# Patient Record
Sex: FEMALE | Race: WHITE | NOT HISPANIC OR LATINO | Employment: OTHER | URBAN - METROPOLITAN AREA
[De-identification: names, ages, dates, MRNs, and addresses within clinical notes are randomized per-mention and may not be internally consistent; named-entity substitution may affect disease eponyms.]

---

## 2022-12-01 ENCOUNTER — OFFICE VISIT (OUTPATIENT)
Dept: URGENT CARE | Facility: CLINIC | Age: 87
End: 2022-12-01
Payer: MEDICARE

## 2022-12-01 VITALS
TEMPERATURE: 98 F | RESPIRATION RATE: 18 BRPM | OXYGEN SATURATION: 97 % | DIASTOLIC BLOOD PRESSURE: 77 MMHG | HEIGHT: 66 IN | SYSTOLIC BLOOD PRESSURE: 180 MMHG | WEIGHT: 150 LBS | BODY MASS INDEX: 24.11 KG/M2 | HEART RATE: 81 BPM

## 2022-12-01 DIAGNOSIS — N39.0 ACUTE UTI: ICD-10-CM

## 2022-12-01 DIAGNOSIS — R31.9 HEMATURIA, UNSPECIFIED TYPE: Primary | ICD-10-CM

## 2022-12-01 LAB
BILIRUB UR QL STRIP: POSITIVE
GLUCOSE UR QL STRIP: NEGATIVE
KETONES UR QL STRIP: NEGATIVE
LEUKOCYTE ESTERASE UR QL STRIP: POSITIVE
PH, POC UA: 5
POC BLOOD, URINE: POSITIVE
POC NITRATES, URINE: POSITIVE
PROT UR QL STRIP: POSITIVE
SP GR UR STRIP: 1.02 (ref 1–1.03)
UROBILINOGEN UR STRIP-ACNC: 12 (ref 0.1–1.1)

## 2022-12-01 PROCEDURE — 99203 PR OFFICE/OUTPT VISIT, NEW, LEVL III, 30-44 MIN: ICD-10-PCS | Mod: ,,, | Performed by: PHYSICIAN ASSISTANT

## 2022-12-01 PROCEDURE — 99203 OFFICE O/P NEW LOW 30 MIN: CPT | Mod: ,,, | Performed by: PHYSICIAN ASSISTANT

## 2022-12-01 PROCEDURE — 81003 POCT URINALYSIS, DIPSTICK, AUTOMATED, W/O SCOPE: ICD-10-PCS | Mod: QW,,, | Performed by: PHYSICIAN ASSISTANT

## 2022-12-01 PROCEDURE — 87088 URINE BACTERIA CULTURE: CPT | Performed by: PHYSICIAN ASSISTANT

## 2022-12-01 PROCEDURE — 81003 URINALYSIS AUTO W/O SCOPE: CPT | Mod: QW,,, | Performed by: PHYSICIAN ASSISTANT

## 2022-12-01 RX ORDER — CIPROFLOXACIN 500 MG/1
500 TABLET ORAL 2 TIMES DAILY
Qty: 14 TABLET | Refills: 0 | Status: SHIPPED | OUTPATIENT
Start: 2022-12-01 | End: 2022-12-08

## 2022-12-01 RX ORDER — POTASSIUM &MAGNESIUM ASPARTATE 250-250 MG
CAPSULE ORAL DAILY
COMMUNITY

## 2022-12-01 RX ORDER — TAMSULOSIN HYDROCHLORIDE 0.4 MG/1
0.4 CAPSULE ORAL DAILY
Qty: 10 CAPSULE | Refills: 0 | Status: SHIPPED | OUTPATIENT
Start: 2022-12-01 | End: 2023-12-28 | Stop reason: ALTCHOICE

## 2022-12-01 RX ORDER — BRIMONIDINE TARTRATE 2 MG/ML
1 SOLUTION/ DROPS OPHTHALMIC EVERY 8 HOURS
COMMUNITY

## 2022-12-01 RX ORDER — SIMVASTATIN 10 MG/1
10 TABLET, FILM COATED ORAL NIGHTLY
COMMUNITY

## 2022-12-01 RX ORDER — ACETAMINOPHEN 500 MG
500 TABLET ORAL EVERY 6 HOURS PRN
COMMUNITY

## 2022-12-01 RX ORDER — LISINOPRIL 20 MG/1
20 TABLET ORAL DAILY
COMMUNITY

## 2022-12-01 RX ORDER — MAGNESIUM 250 MG
250 TABLET ORAL DAILY
COMMUNITY

## 2022-12-01 RX ORDER — METFORMIN HYDROCHLORIDE 500 MG/1
500 TABLET ORAL 2 TIMES DAILY WITH MEALS
COMMUNITY

## 2022-12-01 NOTE — PROGRESS NOTES
"Subjective:       Patient ID: Shaila Munoz is a 87 y.o. female.    Vitals:  height is 5' 6" (1.676 m) and weight is 68 kg (150 lb). Her oral temperature is 98.1 °F (36.7 °C). Her blood pressure is 180/77 (abnormal) and her pulse is 81. Her respiration is 18 and oxygen saturation is 97%.     Chief Complaint: Back Pain (Pt fell this morning, pt has been having back pain for two or three days. Pt's family member noticed blood in urine this morning. )    HPI  elderly female cared for by family noticing gross hematuria and reports of left lower back discomfort over the last several days different than patient slid out of chair this morning needing assistance.  Daughter transporting patient to urgent Care for initial evaluation.  Patient reports having mild left mid lower back discomfort taking Tylenol this morning resting comfortably at this time denies sacral pelvic or lumbar pain.  Daughter reports remote history of kidney stones and last known UTI 6 months ago.     Back Pain     Additional comments: pt has been having left lower back pain   for two or three days.  Family reports patient sliding down out of living room chair onto floor needing assistance by family members assisted to bathroom when family member noticed blood in urine this morning.       Constitution: Negative for chills and fever.   Gastrointestinal:  Negative for abdominal pain, nausea, vomiting, constipation and diarrhea.   Genitourinary:  Positive for flank pain, hematuria and history of kidney stones. Negative for dysuria, frequency, urgency, bladder incontinence and pelvic pain.   Musculoskeletal:  Positive for back pain. Negative for trauma, muscle cramps and muscle ache.   Skin: Negative.  Negative for rash, wound and erythema.   Neurological:  Negative for numbness and tingling.   Psychiatric/Behavioral: Negative.       Objective:      Physical Exam   Constitutional: She is oriented to person, place, and time. She appears well-developed. She " is cooperative.  Non-toxic appearance. She does not appear ill. No distress.      Comments:Awake alert pleasant female talking to daughter     HENT:   Head: Normocephalic.   Mouth/Throat: Oropharynx is clear and moist and mucous membranes are normal.   Eyes: Conjunctivae and lids are normal.   Neck: Trachea normal and phonation normal. Neck supple.   Cardiovascular: Normal rate, regular rhythm and normal pulses.   Pulmonary/Chest: Effort normal.   Abdominal: Normal appearance and bowel sounds are normal. Soft. flat abdomen There is no abdominal tenderness. There is no guarding, no left CVA tenderness and no right CVA tenderness.   Musculoskeletal:         General: No deformity.   Neurological: no focal deficit. She is alert and oriented to person, place, and time. She has normal strength and normal reflexes.   Skin: Skin is warm, dry, intact, not diaphoretic and no rash. No erythema         Comments: Back with no blisters no lesion , no vesicular outbreak   Psychiatric: Her speech is normal and behavior is normal. Mood, judgment and thought content normal.   Nursing note and vitals reviewed.         Previous History      Review of patient's allergies indicates:   Allergen Reactions    Meperidine     Penicillins     Promethazine     Sulfamethoxazole        Past Medical History:   Diagnosis Date    Colon cancer     Diabetes mellitus, type 2     Glaucoma     Hyperlipidemia     Hypertension      Current Outpatient Medications   Medication Instructions    acetaminophen (TYLENOL) 500 mg, Oral, Every 6 hours PRN    brimonidine 0.2% (ALPHAGAN) 0.2 % Drop 1 drop, Every 8 hours    ciprofloxacin HCl (CIPRO) 500 mg, Oral, 2 times daily    cranberry 500 mg Cap Oral, Daily    lisinopriL (PRINIVIL,ZESTRIL) 20 mg, Oral, Daily    magnesium 250 mg, Oral, Daily    metFORMIN (GLUCOPHAGE) 500 mg, Oral, 2 times daily with meals    simvastatin (ZOCOR) 10 mg, Oral, Nightly    tamsulosin (FLOMAX) 0.4 mg, Oral, Daily    timoloL (BETIMOL) 0.5  "% ophthalmic solution 1 drop, Daily     Past Surgical History:   Procedure Laterality Date    APPENDECTOMY      CATARACT EXTRACTION Bilateral     HEMICOLECTOMY Right     HEMORRHOID SURGERY      HYSTERECTOMY      TONSILLECTOMY       Family History   Problem Relation Age of Onset    Leukemia Mother     Liver cancer Father        Social History     Tobacco Use    Smoking status: Former     Types: Cigarettes     Passive exposure: Never    Smokeless tobacco: Never   Substance Use Topics    Alcohol use: Not Currently    Drug use: Never        Physical Exam      Vital Signs Reviewed   BP (!) 180/77   Pulse 81   Temp 98.1 °F (36.7 °C) (Oral)   Resp 18   Ht 5' 6" (1.676 m)   Wt 68 kg (150 lb)   SpO2 97%   BMI 24.21 kg/m²        Procedures    Procedures     Labs     Results for orders placed or performed in visit on 12/01/22   POCT Urinalysis, Dipstick, Automated, W/O Scope   Result Value Ref Range    POC Blood, Urine Positive (A) Negative    POC Bilirubin, Urine Positive (A) Negative    POC Urobilinogen, Urine 12 (A) 0.1 - 1.1    POC Ketones, Urine Negative Negative    POC Protein, Urine Positive (A) Negative    POC Nitrates, Urine Positive (A) Negative    POC Glucose, Urine Negative Negative    pH, UA 5     POC Specific Gravity, Urine 1.020 1.003 - 1.029    POC Leukocytes, Urine Positive (A) Negative       Assessment:       1. Hematuria, unspecified type    2. Acute UTI            Plan:     Case reviewed with patient son  Brain Munoz, concern for large urinary tract infection, tolerating oral fluids with no gross kidney stone visualized on KUB, given patient's penicillin and sulfur allergies will hold Rocephin antibiotic in favor of oral ciprofloxacin antibiotic coverage continued hydration and continued monitoring.    Recommend ciprofloxacin antibiotic coverage for urinary tract infection concern.  Encourage plenty water and noncarbonated fluids.  Recommend Flomax if needed for urinary hesitancy or flank lower " back pain discomfort.  Recommend alternating Tylenol and ibuprofen every 4-6 hours if needed for pain and inflammation.  Recommend follow-up with primary care physician in 3 days for or hematuria re-evaluation if not improving.  Recommend emergency department evaluation sooner if symptoms worsen, fever develops or vomiting unable to tolerate antibiotic.    Hematuria, unspecified type  -     POCT Urinalysis, Dipstick, Automated, W/O Scope  -     XR KUB; Future; Expected date: 12/01/2022    Acute UTI    Other orders  -     tamsulosin (FLOMAX) 0.4 mg Cap; Take 1 capsule (0.4 mg total) by mouth once daily. for 10 days  Dispense: 10 capsule; Refill: 0  -     ciprofloxacin HCl (CIPRO) 500 MG tablet; Take 1 tablet (500 mg total) by mouth 2 (two) times daily. for 7 days  Dispense: 14 tablet; Refill: 0

## 2022-12-01 NOTE — PATIENT INSTRUCTIONS
Recommend ciprofloxacin antibiotic coverage for urinary tract infection concern.  Encourage plenty water and noncarbonated fluids.  Recommend Flomax if needed for urinary hesitancy or flank lower back pain discomfort.  Recommend alternating Tylenol and ibuprofen every 4-6 hours if needed for pain and inflammation.  Recommend follow-up with primary care physician in 3 days for or hematuria re-evaluation if not improving.  Recommend emergency department evaluation sooner if symptoms worsen, fever develops or vomiting unable to tolerate antibiotic.

## 2022-12-03 LAB — BACTERIA UR CULT: NO GROWTH

## 2023-12-28 ENCOUNTER — HOSPITAL ENCOUNTER (EMERGENCY)
Facility: HOSPITAL | Age: 88
Discharge: HOME | End: 2023-12-28
Attending: EMERGENCY MEDICINE
Payer: MEDICARE

## 2023-12-28 VITALS
DIASTOLIC BLOOD PRESSURE: 84 MMHG | WEIGHT: 142 LBS | HEIGHT: 66 IN | SYSTOLIC BLOOD PRESSURE: 154 MMHG | OXYGEN SATURATION: 98 % | RESPIRATION RATE: 18 BRPM | HEART RATE: 76 BPM | BODY MASS INDEX: 22.82 KG/M2 | TEMPERATURE: 98 F

## 2023-12-28 DIAGNOSIS — R91.8 LUNG MASS: ICD-10-CM

## 2023-12-28 DIAGNOSIS — C79.51 METASTATIC CANCER TO BONE: Primary | ICD-10-CM

## 2023-12-28 DIAGNOSIS — M54.2 NECK PAIN: ICD-10-CM

## 2023-12-28 LAB
ALBUMIN SERPL-MCNC: 4.5 G/DL (ref 3.4–4.8)
ALBUMIN/GLOB SERPL: 1.3 RATIO (ref 1.1–2)
ALP SERPL-CCNC: 92 UNIT/L (ref 40–150)
ALT SERPL-CCNC: 10 UNIT/L (ref 0–55)
APPEARANCE UR: CLEAR
AST SERPL-CCNC: 16 UNIT/L (ref 5–34)
BACTERIA #/AREA URNS AUTO: NORMAL /HPF
BASOPHILS # BLD AUTO: 0.03 X10(3)/MCL
BASOPHILS NFR BLD AUTO: 0.4 %
BILIRUB SERPL-MCNC: 0.6 MG/DL
BILIRUB UR QL STRIP.AUTO: NEGATIVE
BNP BLD-MCNC: 78.6 PG/ML
BUN SERPL-MCNC: 14.5 MG/DL (ref 9.8–20.1)
CALCIUM SERPL-MCNC: 10 MG/DL (ref 8.4–10.2)
CHLORIDE SERPL-SCNC: 94 MMOL/L (ref 98–107)
CO2 SERPL-SCNC: 28 MMOL/L (ref 23–31)
COLOR UR AUTO: ABNORMAL
CREAT SERPL-MCNC: 0.82 MG/DL (ref 0.55–1.02)
EOSINOPHIL # BLD AUTO: 0.07 X10(3)/MCL (ref 0–0.9)
EOSINOPHIL NFR BLD AUTO: 1 %
ERYTHROCYTE [DISTWIDTH] IN BLOOD BY AUTOMATED COUNT: 12.3 % (ref 11.5–17)
GFR SERPLBLD CREATININE-BSD FMLA CKD-EPI: >60 MLS/MIN/1.73/M2
GLOBULIN SER-MCNC: 3.5 GM/DL (ref 2.4–3.5)
GLUCOSE SERPL-MCNC: 130 MG/DL (ref 82–115)
GLUCOSE UR QL STRIP.AUTO: NEGATIVE
HCT VFR BLD AUTO: 39.9 % (ref 37–47)
HGB BLD-MCNC: 13.3 G/DL (ref 12–16)
IMM GRANULOCYTES # BLD AUTO: 0.03 X10(3)/MCL (ref 0–0.04)
IMM GRANULOCYTES NFR BLD AUTO: 0.4 %
KETONES UR QL STRIP.AUTO: NEGATIVE
LEUKOCYTE ESTERASE UR QL STRIP.AUTO: NEGATIVE
LIPASE SERPL-CCNC: 22 U/L
LYMPHOCYTES # BLD AUTO: 0.84 X10(3)/MCL (ref 0.6–4.6)
LYMPHOCYTES NFR BLD AUTO: 11.5 %
MAGNESIUM SERPL-MCNC: 2.2 MG/DL (ref 1.6–2.6)
MCH RBC QN AUTO: 29.4 PG (ref 27–31)
MCHC RBC AUTO-ENTMCNC: 33.3 G/DL (ref 33–36)
MCV RBC AUTO: 88.3 FL (ref 80–94)
MONOCYTES # BLD AUTO: 0.49 X10(3)/MCL (ref 0.1–1.3)
MONOCYTES NFR BLD AUTO: 6.7 %
NEUTROPHILS # BLD AUTO: 5.85 X10(3)/MCL (ref 2.1–9.2)
NEUTROPHILS NFR BLD AUTO: 80 %
NITRITE UR QL STRIP.AUTO: NEGATIVE
NRBC BLD AUTO-RTO: 0 %
PH UR STRIP.AUTO: 7.5 [PH]
PLATELET # BLD AUTO: 261 X10(3)/MCL (ref 130–400)
PMV BLD AUTO: 7.8 FL (ref 7.4–10.4)
POTASSIUM SERPL-SCNC: 4.9 MMOL/L (ref 3.5–5.1)
PROT SERPL-MCNC: 8 GM/DL (ref 5.8–7.6)
PROT UR QL STRIP.AUTO: ABNORMAL
RBC # BLD AUTO: 4.52 X10(6)/MCL (ref 4.2–5.4)
RBC #/AREA URNS AUTO: NORMAL /HPF
RBC UR QL AUTO: ABNORMAL
SODIUM SERPL-SCNC: 132 MMOL/L (ref 136–145)
SP GR UR STRIP.AUTO: 1.02 (ref 1–1.03)
SQUAMOUS #/AREA URNS AUTO: NORMAL /HPF
TROPONIN I SERPL-MCNC: <0.01 NG/ML (ref 0–0.04)
UROBILINOGEN UR STRIP-ACNC: 0.2
WBC # SPEC AUTO: 7.31 X10(3)/MCL (ref 4.5–11.5)
WBC #/AREA URNS AUTO: NORMAL /HPF

## 2023-12-28 PROCEDURE — 81003 URINALYSIS AUTO W/O SCOPE: CPT | Performed by: EMERGENCY MEDICINE

## 2023-12-28 PROCEDURE — 25500020 PHARM REV CODE 255: Performed by: EMERGENCY MEDICINE

## 2023-12-28 PROCEDURE — 83690 ASSAY OF LIPASE: CPT | Performed by: EMERGENCY MEDICINE

## 2023-12-28 PROCEDURE — 93010 ELECTROCARDIOGRAM REPORT: CPT | Mod: ,,, | Performed by: INTERNAL MEDICINE

## 2023-12-28 PROCEDURE — 25000003 PHARM REV CODE 250: Performed by: EMERGENCY MEDICINE

## 2023-12-28 PROCEDURE — 84484 ASSAY OF TROPONIN QUANT: CPT | Performed by: EMERGENCY MEDICINE

## 2023-12-28 PROCEDURE — 85025 COMPLETE CBC W/AUTO DIFF WBC: CPT | Performed by: EMERGENCY MEDICINE

## 2023-12-28 PROCEDURE — 99285 EMERGENCY DEPT VISIT HI MDM: CPT | Mod: 25

## 2023-12-28 PROCEDURE — 80053 COMPREHEN METABOLIC PANEL: CPT | Performed by: EMERGENCY MEDICINE

## 2023-12-28 PROCEDURE — 83880 ASSAY OF NATRIURETIC PEPTIDE: CPT | Performed by: EMERGENCY MEDICINE

## 2023-12-28 PROCEDURE — 93005 ELECTROCARDIOGRAM TRACING: CPT

## 2023-12-28 PROCEDURE — 83735 ASSAY OF MAGNESIUM: CPT | Performed by: EMERGENCY MEDICINE

## 2023-12-28 RX ORDER — HYDROCODONE BITARTRATE AND ACETAMINOPHEN 5; 325 MG/1; MG/1
1 TABLET ORAL EVERY 6 HOURS PRN
Qty: 20 TABLET | Refills: 0 | Status: SHIPPED | OUTPATIENT
Start: 2023-12-28 | End: 2023-12-28

## 2023-12-28 RX ORDER — CLONIDINE HYDROCHLORIDE 0.1 MG/1
0.1 TABLET ORAL
Status: COMPLETED | OUTPATIENT
Start: 2023-12-28 | End: 2023-12-28

## 2023-12-28 RX ORDER — HYDROCODONE BITARTRATE AND ACETAMINOPHEN 5; 325 MG/1; MG/1
1 TABLET ORAL EVERY 6 HOURS PRN
Qty: 20 TABLET | Refills: 0 | Status: SHIPPED | OUTPATIENT
Start: 2023-12-28

## 2023-12-28 RX ORDER — IBUPROFEN 400 MG/1
400 TABLET ORAL
Status: COMPLETED | OUTPATIENT
Start: 2023-12-28 | End: 2023-12-28

## 2023-12-28 RX ORDER — ONDANSETRON 4 MG/1
4 TABLET, ORALLY DISINTEGRATING ORAL EVERY 6 HOURS PRN
Qty: 12 TABLET | Refills: 0 | Status: SHIPPED | OUTPATIENT
Start: 2023-12-28

## 2023-12-28 RX ORDER — ONDANSETRON 4 MG/1
4 TABLET, ORALLY DISINTEGRATING ORAL EVERY 6 HOURS PRN
Qty: 12 TABLET | Refills: 0 | Status: SHIPPED | OUTPATIENT
Start: 2023-12-28 | End: 2023-12-28

## 2023-12-28 RX ADMIN — IOPAMIDOL 100 ML: 755 INJECTION, SOLUTION INTRAVENOUS at 11:12

## 2023-12-28 RX ADMIN — IBUPROFEN 400 MG: 400 TABLET ORAL at 10:12

## 2023-12-28 RX ADMIN — CLONIDINE HYDROCHLORIDE 0.1 MG: 0.1 TABLET ORAL at 10:12

## 2023-12-28 NOTE — ED PROVIDER NOTES
Encounter Date: 12/28/2023       History     Chief Complaint   Patient presents with    Neck Pain     C/o neck pain right side for one month. States last night she bent down and heard a pop on the left. Pain to left neck 7 out of 10. Denies deficits. C/o abdomen incision site pain.      88-year-old female with a history of colon cancer which was stage IV in 2011, in remission after oral medication from [facility] for several years, presents to the emergency room due to severe neck pain.  Neck pain started on the right side in his now more on the left side and when she moved, she felt a pop in her neck last night she has been up all night due to pain.  No weakness or numbness to her arms or legs.  She also had a history of hypertension, but took her medication at 6am.  She has no chest pain but does get intermittent shortness of breath.  She has intermittent RUQ abdominal pain as well and has not had no cancer surveillance for the last several years.  Weight is been stable and appetite has been stable.        Review of patient's allergies indicates:   Allergen Reactions    Meperidine     Penicillins     Promethazine     Sulfamethoxazole      Past Medical History:   Diagnosis Date    Colon cancer     Diabetes mellitus, type 2     Glaucoma     Hyperlipidemia     Hypertension      Past Surgical History:   Procedure Laterality Date    APPENDECTOMY      CATARACT EXTRACTION Bilateral     HEMICOLECTOMY Right     HEMORRHOID SURGERY      HYSTERECTOMY      TONSILLECTOMY       Family History   Problem Relation Age of Onset    Leukemia Mother     Liver cancer Father      Social History     Tobacco Use    Smoking status: Former     Types: Cigarettes     Passive exposure: Never    Smokeless tobacco: Never   Substance Use Topics    Alcohol use: Not Currently    Drug use: Never     Review of Systems   Gastrointestinal:  Positive for abdominal pain.   Musculoskeletal:  Positive for neck pain.   All other systems reviewed and are  negative.      Physical Exam     Initial Vitals   BP Pulse Resp Temp SpO2   12/28/23 0924 12/28/23 0917 12/28/23 0917 12/28/23 0917 12/28/23 0924   (!) 210/103 78 18 98.2 °F (36.8 °C) 97 %      MAP       --                Physical Exam    Nursing note and vitals reviewed.  Constitutional: She appears well-developed and well-nourished. She is not diaphoretic. No distress.   HENT:   Head: Normocephalic and atraumatic.   Mouth/Throat: Oropharynx is clear and moist.   Eyes: Conjunctivae are normal. Pupils are equal, round, and reactive to light.   Neck: Neck supple.   Severe neck pain with any movement.   Cardiovascular:  Normal rate, regular rhythm, normal heart sounds and intact distal pulses.           Pulmonary/Chest: Breath sounds normal. No respiratory distress. She has no wheezes. She has no rhonchi. She has no rales.   Abdominal: Abdomen is soft. She exhibits no distension. There is no abdominal tenderness. There is no guarding.   Musculoskeletal:         General: No tenderness or edema. Normal range of motion.      Cervical back: Neck supple.      Comments: Normal motor and sensation to the upper and lower extremities     Neurological: She is alert and oriented to person, place, and time.   Skin: Skin is warm and dry. Capillary refill takes less than 2 seconds. No rash noted.   Psychiatric: She has a normal mood and affect. Thought content normal.         ED Course   Procedures  Labs Reviewed   COMPREHENSIVE METABOLIC PANEL - Abnormal; Notable for the following components:       Result Value    Sodium Level 132 (*)     Chloride 94 (*)     Glucose Level 130 (*)     Protein Total 8.0 (*)     All other components within normal limits   URINALYSIS, REFLEX TO URINE CULTURE - Abnormal; Notable for the following components:    Protein, UA Trace (*)     Blood, UA Small (*)     All other components within normal limits   MAGNESIUM - Normal   LIPASE - Normal   TROPONIN I - Normal   B-TYPE NATRIURETIC PEPTIDE - Normal    URINALYSIS, MICROSCOPIC - Normal   CBC WITH DIFFERENTIAL     EKG Readings: (Independently Interpreted)   Initial Reading: No STEMI. Rhythm: Normal Sinus Rhythm. Heart Rate: 75. ST Segments: Non-Specific ST Segment Depression. T Waves Flipped: AVL. Q Waves: II, III, V4, V5 and V6. Clinical Impression: Normal Sinus Rhythm       Imaging Results              CT Chest Abdomen Pelvis With IV Contrast (XPD) NO Oral Contrast (Final result)  Result time 12/28/23 12:34:18      Final result by Mona Mcpherson MD (12/28/23 12:34:18)                   Impression:      1. Left lower lobe mass and subcarinal adenopathy  2. Hypodense lesion at the liver, indeterminate.  Metastasis is a consideration.  3. Diverticulosis  4. Ideally recommend comparison with any available outside prior exams in this patient with known history of colon malignancy.      Electronically signed by: Mona Mcpherson  Date:    12/28/2023  Time:    12:34               Narrative:    EXAMINATION:  CT CHEST ABDOMEN PELVIS WITH IV CONTRAST (XPD)    CLINICAL HISTORY:  Shortness of breath, RUQ abd pain, history of colon cancer;    TECHNIQUE:  Helical acquisition with axial, sagittal and coronal reformations obtained from the thoracic inlet to the pubic symphysis following the IV administration of contrast.    Automated tube current modulation, weight-based exposure dosing, and/or iterative reconstruction technique utilized to reach lowest reasonably achievable exposure rate.    DLP: 1761 mGy*cm    COMPARISON:  No relevant priors available for comparison at time of this dictation    FINDINGS:  BASE OF NECK: Heterogeneous thyroid.    HEART: Normal size. No effusion.    THORACIC VASCULATURE: Aortic atherosclerosis. .Pulmonary arteries enhance normally.    OSCAR/MEDIASTINUM: There is enlarged lymph node measuring 1.4 cm short axis (10, 50).    AIRWAYS: Patent.    LUNGS/PLEURA: There is a 3.5 x 3.3 cm right lower lobe mass (7, 68).  Mild pulmonary  emphysema.    THORACIC SOFT TISSUES: Unremarkable.    LIVER: There is a 1.2 cm hypodense mass at hepatic segment 4 (10, 104).  A 5 mm subcapsular hypodensity at segment 4 is too small to accurately characterize (10, 95).  Vague hypodensity along the falciform ligament may be related to focal fatty infiltration.    BILIARY: No calcified gallstones.    PANCREAS: No inflammatory change.    SPLEEN: Normal in size    ADRENALS: No mass.    KIDNEYS/URETERS: There is an incidental exophytic cyst at the lower pole left kidney which appears simple and measures 2.7 cm.  No imaging follow-up necessary.  No hydronephrosis.    GI TRACT/MESENTERY:  No evidence of bowel obstruction.  There are postsurgical changes of right hemicolectomy.  Colonic diverticulosis without acute inflammatory change.    PERITONEUM: No free fluid.No free air.    LYMPH NODES: No enlarged lymph nodes by size criteria.    ABDOMINOPELVIC VASCULATURE: Aortoiliac atherosclerosis.    BLADDER: Normal appearance given degree of distention.    REPRODUCTIVE ORGANS: Uterus is surgically absent.    ABDOMINAL WALL: Unremarkable.    BONES: No acute osseous abnormality.                                       CT Cervical Spine Without Contrast (Final result)  Result time 12/28/23 12:12:09      Final result by Misha Anglin MD (12/28/23 12:12:09)                   Impression:      1. Large lytic abnormality involving portion of the dens extending across the base into the C2 body is concerning for a metastatic lesion.  Also small posterior soft tissue component without cord compression.    2. Additional small lytic abnormalities of the spinous process of C2 and the right aspect of C3 vertebral body are again concerning for metastatic lesions.    3. Cervical degenerative disc disease and spondylosis level by level discussed above.      Electronically signed by: Misha Anglin  Date:    12/28/2023  Time:    12:12               Narrative:    EXAMINATION:  CT CERVICAL SPINE  WITHOUT CONTRAST    CLINICAL HISTORY:  Neck pain, acute, known malignancy;    TECHNIQUE:  Sequential axial images were obtained of the cervical spine without contrast and the images were reformatted.    Dose length product was 321 mGycm. Approximated exposure control was utilized to minimize radiation dose.    COMPARISON:  None available    FINDINGS:  There is large lytic destructive abnormality which involves the lower half of dens and across the dens base extends into the C2 body with anterior-posterior diameter of 1.2 cm, transverse diameter of 1.4 cm and craniocaudal span of 4.5 cm on image 34 series 3, image 41 series 7 and image 26 series 5.  The involved posterior margin of C2 shows considerable thinning and discontinuities.  There is small soft tissue component along the posterior aspect of the dens without causing cord compression.  This is concerning for a metastatic lesion.  There is also 9 mm lytic abnormality of the spinous process of C2 on image 35 series 3.  Additionally there is suspected right aspect of C3 vertebral body small lytic lesion measuring 5 mm image 44 series 3.    There is grade 1 anterolisthesis of C4-C5.  There is no prevertebral soft tissue prominence.  Disc segmental analysis is given below:    At C2-C3, there is no disc herniation.  No central canal stenosis or narrowings of the neural foramen.    At C3-C4, there is osteophyte disc complex which indents the ventral thecal sac.  There is also bilateral facet arthropathy.  Cord is not compressed and there are no narrowings of the neural foramen.    At C4-C5, there is ventral osteophyte ridging indenting the ventral thecal sac without causing cord compression.  Mild narrowing of the right neural foramen is caused by uncovertebral and facet arthropathy.  The left neural foramen is unremarkable.    At C5-C6, there is broad osteophyte disc complex which indents the ventral thecal sac without causing cord compression.  There is bilateral  uncovertebral and facet arthropathy resulting in moderate narrowings of the neural foramen.    At C6-C7, there is ventral osteophytic ridging indenting the ventral thecal sac.  Cord is not compressed.  There is bilateral uncovertebral arthropathy without significant narrowings of the neural foramen.    At C7-T1, disc is unremarkable.  Central canal is not stenosed and there are no apparent narrowings of the neural foramen.                                       Medications   cloNIDine tablet 0.1 mg (0.1 mg Oral Given 12/28/23 1007)   ibuprofen tablet 400 mg (400 mg Oral Given 12/28/23 1048)   iopamidoL (ISOVUE-370) injection 100 mL (100 mLs Intravenous Given 12/28/23 1153)     Medical Decision Making  88-year-old female with a history of colon cancer which was stage IV in 2011, in remission after oral medication from [facility] for several years, presents to the emergency room due to severe neck pain.  Neck pain started on the right side in his now more on the left side and when she moved, she felt a pop in her neck last night she has been up all night due to pain.  No weakness or numbness to her arms or legs.  She also had a history of hypertension, but took her medication at 6am.  She has no chest pain but does get intermittent shortness of breath.  She has intermittent RUQ abdominal pain as well and has not had no cancer surveillance for the last several years.  Weight is been stable and appetite has been stable.      Differential diagnosis includes but is not limited to cervical strain, cervical radiculopathy, metastatic C-spine disease    Amount and/or Complexity of Data Reviewed  Labs: ordered. Decision-making details documented in ED Course.  Radiology: ordered. Decision-making details documented in ED Course.  Discussion of management or test interpretation with external provider(s): Patient was seen and evaluated in the emergency department with history, physical exam, lab work, CT scan.  She has a lytic  "lesion and C2 which could have catastrophic effects.  It is possible that she can move wrong and lacerate her spinal cord which could her cause to lose sensation, become paralyzed, become a quadriplegic, or even stop breathing.  Hard C-collar was placed which she did not tolerate so we placed a soft C-collar.  Case discussed with Dr. Garay, neurosurgery on-call, who recommends transfer to [facility] for surgical treatment which would include stabilization surgery after MRI is done.  After discussion with the patient and her family, she is decided she wants to go home.  She says "I have lived a good life and I do not want surgery".  She is mentally competent and understands her decision and is with her daughter-in-law who has power-of-.  They are in agreement.  I am giving a prescription for Norco and have discussed the pros, cons, risks of this medication.  She is stable for discharge home and all questions were answered.    Risk  Prescription drug management.  Decision regarding hospitalization.               ED Course as of 12/28/23 1510   Thu Dec 28, 2023   1029 WBC: 7.31 [SH]   1029 Hemoglobin: 13.3 [SH]   1029 Hematocrit: 39.9 [SH]   1029 Platelet Count: 261 [SH]   1154 IV infiltrated.  Swelling noted to the right upper arm which she states is not painful.  Ice pack applied.  CT scans are pending. [SH]   1155 RBC, UA: 3-5  Few red blood cells in the urine, urine culture pending [SH]   1350 CT Chest Abdomen Pelvis With IV Contrast (XPD) NO Oral Contrast [SH]      ED Course User Index  [SH] Court Munoz MD                           Clinical Impression:  Final diagnoses:  [M54.2] Neck pain  [C79.51] Metastatic cancer to bone (Primary)  [R91.8] Lung mass          ED Disposition Condition    Discharge Stable          ED Prescriptions       Medication Sig Dispense Start Date End Date Auth. Provider    HYDROcodone-acetaminophen (NORCO) 5-325 mg per tablet  (Status: Discontinued) " Take 1 tablet by mouth every 6 (six) hours as needed for Pain. 20 tablet 12/28/2023 12/28/2023 Court Munoz MD    ondansetron (ZOFRAN-ODT) 4 MG TbDL  (Status: Discontinued) Take 1 tablet (4 mg total) by mouth every 6 (six) hours as needed (nausea). 12 tablet 12/28/2023 12/28/2023 Court Munoz MD    HYDROcodone-acetaminophen (NORCO) 5-325 mg per tablet Take 1 tablet by mouth every 6 (six) hours as needed for Pain. 20 tablet 12/28/2023 -- Court Munoz MD    ondansetron (ZOFRAN-ODT) 4 MG TbDL Take 1 tablet (4 mg total) by mouth every 6 (six) hours as needed (nausea). 12 tablet 12/28/2023 -- Court Munoz MD          Follow-up Information       Follow up With Specialties Details Why Contact Info    Jonah Worley MD Oncology, Hematology and Oncology Schedule an appointment as soon as possible for a visit   [address] [place] [id number]  [phone]               Court Munoz MD  12/28/23 0442

## 2023-12-28 NOTE — ED NOTES
Bed: 01  Expected date:   Expected time:   Means of arrival:   Comments:  86 year old Female Neck Pain